# Patient Record
Sex: MALE
[De-identification: names, ages, dates, MRNs, and addresses within clinical notes are randomized per-mention and may not be internally consistent; named-entity substitution may affect disease eponyms.]

---

## 2023-09-21 ENCOUNTER — DASHBOARD ENCOUNTERS (OUTPATIENT)
Age: 63
End: 2023-09-21

## 2023-09-21 ENCOUNTER — OFFICE VISIT (OUTPATIENT)
Dept: URBAN - METROPOLITAN AREA CLINIC 82 | Facility: CLINIC | Age: 63
End: 2023-09-21
Payer: SELF-PAY

## 2023-09-21 VITALS
BODY MASS INDEX: 38.49 KG/M2 | HEART RATE: 64 BPM | WEIGHT: 231 LBS | HEIGHT: 65 IN | DIASTOLIC BLOOD PRESSURE: 80 MMHG | SYSTOLIC BLOOD PRESSURE: 124 MMHG | TEMPERATURE: 97.2 F | RESPIRATION RATE: 20 BRPM

## 2023-09-21 DIAGNOSIS — R17 JAUNDICE: ICD-10-CM

## 2023-09-21 PROCEDURE — 99204 OFFICE O/P NEW MOD 45 MIN: CPT | Performed by: INTERNAL MEDICINE

## 2023-09-21 NOTE — HPI-TODAY'S VISIT:
The patient is a 63 yo male presenting for evaluation of jaundice.  History very difficult since the patient is only Sami speaking ( used) and he does not have his records or know why he is referred. From my best guess, he had painless jaundice started about 1 month ago.  Prior to that, he drinks throughout the week and on the weekend.  Was told to stop ETOH.  Per the patient, his jaundice has improved.  He recently had an US and was referred to go see me but I don't have US report.